# Patient Record
(demographics unavailable — no encounter records)

---

## 2024-10-08 NOTE — HISTORY OF PRESENT ILLNESS
[FreeTextEntry1] : October 8, 2024 Patient returns for follow up of SLE  March 7, 2024 Patient returns for follow up of SLE Patient called this morning to change to telehealth visit as was not feeling well this morning. Discussed with patient.  You have chosen to receive care through the use of tele-media.  Telemedia enables health care providers at different locations to provide safe, effective, and convenient care through the use of technology.  Please note this is a billable encounter.  Patient understands that I cannot perform a physical exam and that patient may need to come to the clinic to complete the assessment.  Patient agreed verbally to understanding the risks and benefits of telemedia as explained. Patient is located in [place], provider is located in [place]. Patient had recent evaluation by gynecologist, raise the question of possible hidradenitis. Mother with hidradenitis as well. Gynecologist recommended dermatology evaluation, primary care doctor referred to 1 in practice. Patient had recent blood test, diagnosed with urinary tract infection, completed antibiotics.  Has follow-up with gynecologist on March 20. Reports an increase in weight now weighing 200 pounds. Continues hydroxychloroquine 400 mg daily and CellCept 1 gm twice daily. Has ophthalmology follow-up April 4. Discussed in person evaluation, office will reach out to patient to schedule.  August 4, 2023 Patient returns for follow up Was seen in the emergency room for abdominal pain and headache, on August 2 Results reviewed with patient Given fluids and antibiotics as diagnosed with UTI Started cephalexin, now taking for 14 days, started today BMP normal AST 70 ALT 54 alk phos 101 lipase 44 WAB 5.1 hgb 10.6 hct 32.6 plt 177 UA >50 leukocyte large Also with right knee pain, no swelling noted, better today no fevers, chills No chest pain or shortness of breath No rashes +weight gain Continues hydroxychloroquine 200 mg twice daily Continues CellCept 1 g twice daily Continues enalapril 7.5 mg daily Patient will make appointment with ophthalmology for retinal monitoring given Plaquenil use.  February 28, 2023 Patient returns for follow-up Overall feeling well Was feeling left knee pain for the past few weeks, now much improved. Reviewed previous lab results, patient did not restart iron as recently changed pharmacy  Patient is tired as just worked overnight. Continues hydroxychloroquine 200 mg twice daily Will place referral for ophthalmology Continues mycophenolate 1 g twice daily, Enalapril 7.5 mg daily Discussed iron and vitamin D supplementation No joint pain at this time  No oral ulcers No rashes No chest pain or shortness of breath.  November 29, 2022 Patient returned for follow up Patient feeling well. Wrist pain, improved since last visit Has some shoulder pain following work Continues current regimen of hydroxychloroquine 200 mg twice daily Mycophenolate 1 gm twice daily Enalapril 7.5 mg daily No medications for pain at this time No wrist pain at this time Overall feeling well, feels tired from work No side effects of medications noted  February 7, 2022 Feels left wrist pain, started in 12/2021 Likely at work, no known injury or trauma Went to urgent care, wearing brace on left wrist, wears when goes outside to protect as well as at work For this week, not using brace at house Feels getting better, swelling improving, previously could not move wrist joint, now ROM improving although not at baseline Patient is right handed ER gave her naproxen took a couple of doses, swellig improved with naproxen, now taking tylenol as well prn No recent medications for pain Xrays negative x 2  No other joint pain at this time. No other concerns, no new medications Had covid infection, positive test 12/30, was feeling not well for about two weeks.  July 16, 2021 Patient returns for follow p Feels some pain in the right knee with twisting, takes Tylenol which helps, takes about once this week. Pain present for about one week Patient otherwise feeling well Pain worse after standing for long periods of time, particularly for work Patient has had right knee swelling for long time. No pain at present. No other complaints at tis time  March 15, 2021 Patient returns for follow up.  Patient previously followed by Dr. Lopez, here to establish care.  Patient previously followed by pediatric rheumatologist, transitioned to adult rheumatology.   History obtained from previous notes:  1. SLE: Onset at age 13 in 2013 at which time she had malar rash, arthritis, Raynaud's, oral ulcers, Hb 4.4, and plts 7,000. [facility] and spent two [facility]. As per notes, anemia, thrombocytopenia, hypocomplementemia, nasal and oral ulcers, arthritis, and discoid lupus, treated with pulse solumedrol, 2 units PRBC and 2 platelet transfusions.   She has been treated with MMF and HCQ, and she has not had any recurrences. She has had intermittent pain and contractures of the right elbow. No thrombosis, serositis, nephritis. Pt was on MMF 2 gms/day since 3/19.   At the present patient is taking cellcept 500 mg bid Plaquenil 200 bid Has appointment on Thursday for ophthalmology Takes enapril 7.5 mg qday  Going to try to start going to the gym to increase exercise No chest pain or shortness of breath No tobacco use  No family history of SLE Takes vitamin d daily Takes tylenol for pain as needed, usually for right knee pain, about once per week Working nights so feels a little tired today

## 2024-10-08 NOTE — ASSESSMENT
[FreeTextEntry1] : 24 year old woman with history of SLE diagnosed in 2013 with GABRIEL+, SSa+, SSb+, and smith low positive, initially with cytopenias (hgb 4.4, Plt 7,000), oral ulcers, arthritis, and malar rash.  Patient returns for follow up via telehealth visit.  Since last visit, patient was evaluated by gynecologist, noted to have active skin lesion, thought possibly secondary to hidradenitis, and referred to dermatologist for further evaluation and possible treatment (mother with history of HS as well).  Reports recent labs from PMD, will forward to office, email address provided to patient.  Will continue cellcept 1 gm bid and plaquenil 200 mg bid. Ophthalmology follow up scheduled 4/4/2024.  Again discussed risks and benefits of mycophenolate, including teratogenicity of cellcept with patient and need to discontinue medication prior to pregnancy. Will schedule a follow up appt with patient in person for further evaluation.